# Patient Record
Sex: MALE | Race: ASIAN | NOT HISPANIC OR LATINO | URBAN - METROPOLITAN AREA
[De-identification: names, ages, dates, MRNs, and addresses within clinical notes are randomized per-mention and may not be internally consistent; named-entity substitution may affect disease eponyms.]

---

## 2021-05-05 ENCOUNTER — OFFICE VISIT (OUTPATIENT)
Dept: URBAN - METROPOLITAN AREA CLINIC 128 | Facility: CLINIC | Age: 66
End: 2021-05-05

## 2021-05-26 ENCOUNTER — OFFICE VISIT (OUTPATIENT)
Dept: URBAN - METROPOLITAN AREA CLINIC 128 | Facility: CLINIC | Age: 66
End: 2021-05-26

## 2023-09-13 ENCOUNTER — WEB ENCOUNTER (OUTPATIENT)
Dept: URBAN - METROPOLITAN AREA CLINIC 128 | Facility: CLINIC | Age: 68
End: 2023-09-13

## 2023-09-13 ENCOUNTER — OFFICE VISIT (OUTPATIENT)
Dept: URBAN - METROPOLITAN AREA CLINIC 128 | Facility: CLINIC | Age: 68
End: 2023-09-13
Payer: COMMERCIAL

## 2023-09-13 ENCOUNTER — DASHBOARD ENCOUNTERS (OUTPATIENT)
Age: 68
End: 2023-09-13

## 2023-09-13 VITALS
BODY MASS INDEX: 26.07 KG/M2 | HEIGHT: 68 IN | WEIGHT: 172 LBS | TEMPERATURE: 97.9 F | DIASTOLIC BLOOD PRESSURE: 70 MMHG | SYSTOLIC BLOOD PRESSURE: 140 MMHG

## 2023-09-13 DIAGNOSIS — Z85.038 PERSONAL HISTORY OF COLON CANCER: ICD-10-CM

## 2023-09-13 PROBLEM — 429699009: Status: ACTIVE | Noted: 2023-09-13

## 2023-09-13 PROCEDURE — 99203 OFFICE O/P NEW LOW 30 MIN: CPT | Performed by: PHYSICIAN ASSISTANT

## 2023-09-13 NOTE — HPI-TODAY'S VISIT:
The patient presents for a colonoscopy. The patient has a personal history of colon cancer diagnosed in 2019 and had a colon robotic resection of the hepatic flexure and sigmoid colon with anastomosis. There is no family history of colon polyps or colon cancer. Patient denies change in bowel habits, appetite, and weight. Patient denies rectal bleeding. Patient denies any cardiac, lung, or kidney problems. He denies oxygen or blood thinner use. Patient denies any digestive symptoms currently. Prior colonoscopy: 2019 revealed tubular adenoma and tubulovillous adenoma with high grade adenocarcinoma. His surgeon Dr. Ike Villaseñor did a colonoscopy 12/3/2020 which revealed a tubular adenoma in the descending colon and a 3 year repeat colonoscopy was recommended. He is under the care of oncologist Dr. Edward.

## 2023-11-29 ENCOUNTER — OUT OF OFFICE VISIT (OUTPATIENT)
Dept: URBAN - METROPOLITAN AREA SURGERY CENTER 31 | Facility: SURGERY CENTER | Age: 68
End: 2023-11-29
Payer: COMMERCIAL

## 2023-11-29 ENCOUNTER — CLAIMS CREATED FROM THE CLAIM WINDOW (OUTPATIENT)
Dept: URBAN - METROPOLITAN AREA CLINIC 4 | Facility: CLINIC | Age: 68
End: 2023-11-29
Payer: COMMERCIAL

## 2023-11-29 DIAGNOSIS — Z85.038 H/O COLON CANCER, STAGE I: ICD-10-CM

## 2023-11-29 DIAGNOSIS — Z86.010 ADENOMAS PERSONAL HISTORY OF COLONIC POLYPS: ICD-10-CM

## 2023-11-29 DIAGNOSIS — Z98.0 INTESTINAL BYPASS AND ANASTOMOSIS STATUS: ICD-10-CM

## 2023-11-29 DIAGNOSIS — K63.5 BENIGN COLON POLYP: ICD-10-CM

## 2023-11-29 DIAGNOSIS — Z12.11 COLON CANCER SCREENING (HIGH RISK): ICD-10-CM

## 2023-11-29 DIAGNOSIS — D12.3 BENIGN NEOPLASM OF TRANSVERSE COLON: ICD-10-CM

## 2023-11-29 DIAGNOSIS — D12.8 ADENOMATOUS POLYP OF RECTUM: ICD-10-CM

## 2023-11-29 DIAGNOSIS — D12.3 ADENOMA OF TRANSVERSE COLON: ICD-10-CM

## 2023-11-29 DIAGNOSIS — Z85.038 PERSONAL HISTORY OF COLON CANCER: ICD-10-CM

## 2023-11-29 PROCEDURE — 45385 COLONOSCOPY W/LESION REMOVAL: CPT | Performed by: INTERNAL MEDICINE

## 2023-11-29 PROCEDURE — G8907 PT DOC NO EVENTS ON DISCHARG: HCPCS | Performed by: INTERNAL MEDICINE

## 2023-11-29 PROCEDURE — 88305 TISSUE EXAM BY PATHOLOGIST: CPT | Performed by: PATHOLOGY

## 2023-11-29 PROCEDURE — 45380 COLONOSCOPY AND BIOPSY: CPT | Performed by: INTERNAL MEDICINE

## 2023-11-29 PROCEDURE — 00811 ANES LWR INTST NDSC NOS: CPT | Performed by: NURSE ANESTHETIST, CERTIFIED REGISTERED

## 2025-04-14 ENCOUNTER — OFFICE VISIT (OUTPATIENT)
Dept: URBAN - METROPOLITAN AREA CLINIC 128 | Facility: CLINIC | Age: 70
End: 2025-04-14
Payer: COMMERCIAL

## 2025-04-14 ENCOUNTER — LAB OUTSIDE AN ENCOUNTER (OUTPATIENT)
Dept: URBAN - METROPOLITAN AREA CLINIC 128 | Facility: CLINIC | Age: 70
End: 2025-04-14

## 2025-04-14 DIAGNOSIS — R10.816 EPIGASTRIC ABDOMINAL TENDERNESS WITHOUT REBOUND TENDERNESS: ICD-10-CM

## 2025-04-14 DIAGNOSIS — L29.0 ANAL ITCHING: ICD-10-CM

## 2025-04-14 DIAGNOSIS — K64.1 GRADE II INTERNAL HEMORRHOIDS: ICD-10-CM

## 2025-04-14 DIAGNOSIS — K64.0 GRADE I INTERNAL HEMORRHOIDS: ICD-10-CM

## 2025-04-14 DIAGNOSIS — Z85.038 PERSONAL HISTORY OF COLON CANCER: ICD-10-CM

## 2025-04-14 DIAGNOSIS — R19.4 CHANGE IN BOWEL HABITS: ICD-10-CM

## 2025-04-14 DIAGNOSIS — R10.13 EPIGASTRIC ABDOMINAL PAIN: ICD-10-CM

## 2025-04-14 PROBLEM — 721704005: Status: ACTIVE | Noted: 2025-04-14

## 2025-04-14 PROBLEM — 721703004: Status: ACTIVE | Noted: 2025-04-14

## 2025-04-14 PROCEDURE — 99214 OFFICE O/P EST MOD 30 MIN: CPT | Performed by: INTERNAL MEDICINE

## 2025-04-14 RX ORDER — HYDROCORTISONE ACETATE 25 MG/1
1 SUPPOSITORY SUPPOSITORY RECTAL
Qty: 10 SUPPOSITORY | Refills: 1 | OUTPATIENT
Start: 2025-04-14 | End: 2025-04-28

## 2025-04-14 NOTE — HPI-TODAY'S VISIT:
Mr. Garza comes in for a follow up visit. He offers several episodes of recurrent epigastric pain over the past 3 months that last for a day and then resolves with soreness in the upper abdomen for another day.  Two of the epidsodes were severe and assoc with nausea and vomiting.  No overt GI bleeding. Self limited diarrhea assoc with one of the episodes.  No NSAIDs. No new medications.  No similar illness in family or friends.  He was seen in the ER with one of the episodes of pain - CV evaluation was negative.  He does have hx of CAD but no stents.  He has undergone a fairly extensive cardiac evaluation in the recent past.  He takes 81mg aspirin and atorvastatin under the direction of his cardiologist.   There is also a mild change in bowel habits over the past several months, possibly starting shortly after starting statin therapy.  He consumes some caffeine but not excessive.  He has 2 glasses of wine most days of the week.  There has been some anal itching after evacuations over this time frame as well. He admits to a lot more straining to evacuate the rectum with the change in stool consistency.    There has been stress lately.  His wife has been diagnosed with stage 4 lung cancer with pleura mets at the time of attempted resection.  She is currently undergoing additional therapy.    Review of imaging performed as follow up of hx of colon cancer s/p resection identified small stones and sludge in the gallbladder.

## 2025-04-14 NOTE — PHYSICAL EXAM GASTROINTESTINAL
Abdomen , soft, mild epigastric tenderness, nondistended , no guarding or rigidity , no masses palpable , normal bowel sounds well healed surgical scars,  Liver and Spleen , no hepatosplenomegaly Rectal small external hemorrhoid on the right, no fissure. Grade 1 LL and RP internal hemorrhoids, grade 2 RA internal hemorrhoid

## 2025-04-25 ENCOUNTER — LAB OUTSIDE AN ENCOUNTER (OUTPATIENT)
Dept: URBAN - METROPOLITAN AREA CLINIC 128 | Facility: CLINIC | Age: 70
End: 2025-04-25

## 2025-05-01 ENCOUNTER — OFFICE VISIT (OUTPATIENT)
Dept: URBAN - METROPOLITAN AREA CLINIC 128 | Facility: CLINIC | Age: 70
End: 2025-05-01

## 2025-06-05 ENCOUNTER — TELEPHONE ENCOUNTER (OUTPATIENT)
Dept: URBAN - METROPOLITAN AREA CLINIC 128 | Facility: CLINIC | Age: 70
End: 2025-06-05

## 2025-06-05 ENCOUNTER — CLAIMS CREATED FROM THE CLAIM WINDOW (OUTPATIENT)
Dept: URBAN - METROPOLITAN AREA SURGERY CENTER 31 | Facility: SURGERY CENTER | Age: 70
End: 2025-06-05
Payer: COMMERCIAL

## 2025-06-05 DIAGNOSIS — K29.60 OTHER GASTRITIS WITHOUT BLEEDING: ICD-10-CM

## 2025-06-05 DIAGNOSIS — K29.60 ADENOPAPILLOMATOSIS GASTRICA: ICD-10-CM

## 2025-06-05 DIAGNOSIS — K31.89 ACHYLIA: ICD-10-CM

## 2025-06-05 DIAGNOSIS — R11.2 ACUTE NAUSEA WITH NONBILIOUS VOMITING: ICD-10-CM

## 2025-06-05 DIAGNOSIS — E11.9 TYPE 2 DIABETES MELLITUS WITHOUT COMPLICATION, WITHOUT LONG-TERM CURRENT USE OF INSULIN: ICD-10-CM

## 2025-06-05 PROBLEM — 266433003: Status: ACTIVE | Noted: 2025-06-05

## 2025-06-05 PROCEDURE — 00731 ANES UPR GI NDSC PX NOS: CPT | Performed by: NURSE ANESTHETIST, CERTIFIED REGISTERED

## 2025-06-05 PROCEDURE — 43239 EGD BIOPSY SINGLE/MULTIPLE: CPT | Performed by: INTERNAL MEDICINE

## 2025-06-05 RX ORDER — PANTOPRAZOLE SODIUM 20 MG/1
1 TABLET TABLET, DELAYED RELEASE ORAL ONCE A DAY
Qty: 90 | Refills: 3 | OUTPATIENT
Start: 2025-06-05